# Patient Record
Sex: MALE | Race: WHITE | Employment: UNEMPLOYED | URBAN - METROPOLITAN AREA
[De-identification: names, ages, dates, MRNs, and addresses within clinical notes are randomized per-mention and may not be internally consistent; named-entity substitution may affect disease eponyms.]

---

## 2017-01-21 ENCOUNTER — APPOINTMENT (OUTPATIENT)
Dept: ULTRASOUND IMAGING | Facility: CLINIC | Age: 32
End: 2017-01-21
Attending: EMERGENCY MEDICINE

## 2017-01-21 ENCOUNTER — HOSPITAL ENCOUNTER (EMERGENCY)
Facility: CLINIC | Age: 32
Discharge: HOME | End: 2017-01-21
Attending: EMERGENCY MEDICINE | Admitting: EMERGENCY MEDICINE

## 2017-01-21 VITALS
RESPIRATION RATE: 16 BRPM | SYSTOLIC BLOOD PRESSURE: 126 MMHG | OXYGEN SATURATION: 98 % | BODY MASS INDEX: 31.5 KG/M2 | WEIGHT: 220 LBS | DIASTOLIC BLOOD PRESSURE: 96 MMHG | TEMPERATURE: 98.1 F | HEIGHT: 70 IN

## 2017-01-21 DIAGNOSIS — I87.2 STASIS DERMATITIS OF BOTH LEGS: ICD-10-CM

## 2017-01-21 PROCEDURE — 93970 EXTREMITY STUDY: CPT

## 2017-01-21 PROCEDURE — 99284 EMERGENCY DEPT VISIT MOD MDM: CPT | Mod: Z6 | Performed by: EMERGENCY MEDICINE

## 2017-01-21 PROCEDURE — 99284 EMERGENCY DEPT VISIT MOD MDM: CPT | Mod: 25 | Performed by: EMERGENCY MEDICINE

## 2017-01-21 RX ORDER — TRIAMCINOLONE ACETONIDE 1 MG/G
OINTMENT TOPICAL 2 TIMES DAILY
Qty: 80 G | Refills: 0 | Status: SHIPPED | OUTPATIENT
Start: 2017-01-21

## 2017-01-21 ASSESSMENT — ENCOUNTER SYMPTOMS
ARTHRALGIAS: 1
NUMBNESS: 0

## 2017-01-21 NOTE — ED AVS SNAPSHOT
[facility], [place], Emergency Department    [address]    [facility] [address]    Phone:  [phone]    Fax:  [phone]                                       Jones Mccabe   MRN: [id number]    Department:  [facility], Emergency Department   Date of Visit:  1/21/2017           After Visit Summary Signature Page     I have received my discharge instructions, and my questions have been answered. I have discussed any challenges I see with this plan with the nurse or doctor.    ..........................................................................................................................................  Patient/Patient Representative Signature      ..........................................................................................................................................  Patient Representative Print Name and Relationship to Patient    ..................................................               ................................................  Date                                            Time    ..........................................................................................................................................  Reviewed by Signature/Title    ...................................................              ..............................................  Date                                                            Time

## 2017-01-21 NOTE — ED PROVIDER NOTES
History     Chief Complaint   Patient presents with     Foot Pain     Bi-lat foot pain and redness started yesterday. Increasing pain. Denies injury to area.      NEREYDA Mccabe is a 31 year old male with a medical history significant for asthma and heroin abuse who presents to the emergency department for evaluation of bilateral foot pain and swelling that he first noticed yesterday. Patient states that he spends a lot of time on his feet most days, and reports that he first noticed swelling in the right lower leg and foot yesterday evening. He notes that the left lower leg and foot became swollen as well. He reports painful ambulation and itching, but denies numbness and cannot recall an injury to either foot. Of note, the patient presented to this emergency department with the same complaint of bilateral foot pain and redness about 10 months ago, and at that time his swelling was deemed likely due to statis dermatitis. Patient reports no history of DVT or PE. He uses an albuterol inhaler as needed for management of his asthma.    I have reviewed the Medications, Allergies, Past Medical and Surgical History, and Social History in the echoecho system.    PAST MEDICAL HISTORY:   Past Medical History   Diagnosis Date     Asthma      Substance abuse        PAST SURGICAL HISTORY: History reviewed. No pertinent past surgical history.    FAMILY HISTORY: No family history on file.    SOCIAL HISTORY:   Social History   Substance Use Topics     Smoking status: Current Some Day Smoker -- 0.50 packs/day     Smokeless tobacco: Never Used     Alcohol Use: No       Discharge Medication List as of 1/21/2017  3:11 AM      START taking these medications    Details   triamcinolone (KENALOG) 0.1 % ointment Apply topically 2 times dailyDisp-80 g, R-0Local Print         CONTINUE these medications which have NOT CHANGED    Details   albuterol (PROAIR HFA, PROVENTIL HFA, VENTOLIN HFA) 108 (90 BASE) MCG/ACT inhaler Inhale 2 puffs into  "the lungs every 6 hours as needed for shortness of breath / dyspnea or wheezing, Disp-1 Inhaler, R-1, Fax            No Known Allergies    Review of Systems   Constitutional: Negative.  Negative for fever, chills, diaphoresis, appetite change and fatigue.   HENT: Negative for congestion, rhinorrhea and sore throat.    Respiratory: Negative for cough, chest tightness and shortness of breath.    Cardiovascular: Positive for leg swelling. Negative for chest pain and palpitations.   Gastrointestinal: Negative for nausea, vomiting, abdominal pain and diarrhea.   Genitourinary: Negative for dysuria, flank pain and difficulty urinating.   Musculoskeletal: Positive for myalgias, arthralgias and gait problem. Negative for back pain, neck pain and neck stiffness.   Skin: Positive for rash. Negative for wound.   Allergic/Immunologic: Negative for immunocompromised state.   Neurological: Negative for dizziness, syncope, weakness, numbness and headaches.   Hematological: Does not bruise/bleed easily.       Physical Exam   BP: (!) 126/96 mmHg  Heart Rate: 102  Temp: 98.1  F (36.7  C)  Resp: 16  Height: 177.8 cm (5' 10\")  Weight: 99.791 kg (220 lb)  SpO2: 98 %  Physical Exam   Constitutional: He appears well-developed and well-nourished. No distress.   HENT:   Head: Normocephalic and atraumatic.   Mouth/Throat: Oropharynx is clear and moist.   Eyes: Conjunctivae and EOM are normal.   Neck: Normal range of motion. Neck supple.   Cardiovascular: Normal rate, regular rhythm, normal heart sounds and intact distal pulses.    Pulmonary/Chest: Effort normal and breath sounds normal. No respiratory distress.   Abdominal: Soft. There is no tenderness.   Musculoskeletal: Normal range of motion. He exhibits edema and tenderness.        Right upper leg: Normal.        Left upper leg: Normal.        Right lower leg: He exhibits tenderness, swelling and edema. He exhibits no bony tenderness.        Left lower leg: He exhibits tenderness, " swelling and edema. He exhibits no bony tenderness.   Neurological: He is alert. He has normal strength. No sensory deficit.   Skin: Skin is warm and dry. Rash noted. There is erythema.   Increased pigmentation bilateral lower legs. Erythema. Tenderness both legs. Papules. No crepitance. No significant ulceration. No blebs or bullae.   Psychiatric: He has a normal mood and affect. His behavior is normal.   Nursing note and vitals reviewed.      ED Course     [unfilled]  Procedures       1:46 AM  The patient was seen and examined by Sudheer Loaiza MD in Room 20.          Critical Care time:  none               Labs Ordered and Resulted from Time of ED Arrival Up to the Time of Departure from the ED - No data to display    Assessments & Plan (with Medical Decision Making)   Stasis dermatitis. No evidence of cellulitis or necrotizing infection. Doppler is negative for DVT. Plan elevation, support stockings, triamcinolone ointment. Clinic follow up this week. Return if persistent symptoms.    I have reviewed the nursing notes.    I have reviewed the findings, diagnosis, plan and need for follow up with the patient.    Discharge Medication List as of 1/21/2017  3:11 AM      START taking these medications    Details   triamcinolone (KENALOG) 0.1 % ointment Apply topically 2 times dailyDisp-80 g, R-0Local Print             Final diagnoses:   Stasis dermatitis of both legs     IWilliam, am serving as a trained medical scribe to document services personally performed by Sudheer Loaiza MD, based on the provider's statements to me.   Sudheer NUNN MD, was physically present and have reviewed and verified the accuracy of this note documented by William Dixon.  1/21/2017   [facility], EMERGENCY DEPARTMENT      Sudheer Loaiza MD  02/09/17 0648

## 2017-01-21 NOTE — DISCHARGE INSTRUCTIONS
Elevate legs often.  Use compression-support stockings.  Use ointment as directed.  Follow up in your primary care clinic one week.  Return if persistent symptoms.    Please make an appointment to follow up with [facility] (phone: [phone]) as soon as possible even if entirely better.

## 2017-01-21 NOTE — ED AVS SNAPSHOT
[facility], Emergency Department    [address]    [place] [address]    Phone:  [phone]    Fax:  [phone]                                       Jones Mccabe   MRN: [id number]    Department:  [facility], Emergency Department   Date of Visit:  1/21/2017           Patient Information     Date Of Birth          1985        Your diagnoses for this visit were:     Stasis dermatitis of both legs        You were seen by Sudheer Loaiza MD.      Follow-up Information     Follow up with Anne Canales Md.    Specialty:  Clinic    Contact information:    [address] [facility] [address]  [phone]          Discharge Instructions       Elevate legs often.  Use compression-support stockings.  Use ointment as directed.  Follow up in your primary care clinic one week.  Return if persistent symptoms.    Please make an appointment to follow up with [facility] (phone: [phone]) as soon as possible even if entirely better.      24 Hour Appointment Hotline       To make an appointment at any [facility], call [phone] ([phone]). If you don't have a family doctor or clinic, we will help you find one. [facility] clinics are conveniently located to serve the needs of you and your family.             Review of your medicines      START taking        Dose / Directions Last dose taken    triamcinolone 0.1 % ointment   Commonly known as:  KENALOG   Quantity:  80 g        Apply topically 2 times daily   Refills:  0          Our records show that you are taking the medicines listed below. If these are incorrect, please call your family doctor or clinic.        Dose / Directions Last dose taken    albuterol 108 (90 BASE) MCG/ACT Inhaler   Commonly known as:  PROAIR HFA/PROVENTIL HFA/VENTOLIN HFA   Dose:  2 puff   Quantity:  1 Inhaler        Inhale 2 puffs into the lungs every 6 hours as needed for shortness of breath / dyspnea or wheezing   Refills:  1               "  Prescriptions were sent or printed at these locations (1 Prescription)                   Other Prescriptions                Printed at Department/Unit printer (1 of 1)         triamcinolone (KENALOG) 0.1 % ointment                Procedures and tests performed during your visit     US Lower Extremity Venous Duplex Bilateral      Orders Needing Specimen Collection     None      Pending Results     No orders found from 2017 to 2017.            Pending Culture Results     No orders found from 2017 to 2017.            Thank you for choosing [facility]       Thank you for choosing [facility] for your care. Our goal is always to provide you with excellent care. Hearing back from our patients is one way we can continue to improve our services. Please take a few minutes to complete the written survey that you may receive in the mail after you visit with us. Thank you!        [facility] Information     [employer] lets you send messages to your doctor, view your test results, renew your prescriptions, schedule appointments and more. To sign up, go to [email or web address] . Click on \"Log in\" on the left side of the screen, which will take you to the Welcome page. Then click on \"Sign up Now\" on the right side of the page.     You will be asked to enter the access code listed below, as well as some personal information. Please follow the directions to create your username and password.     Your access code is: [email or web address]  Expires: 2017  3:11 AM     Your access code will  in 90 days. If you need help or a new code, please call your [facility] clinic or [phone].        Care EveryWhere ID     This is your Care EveryWhere ID. This could be used by other organizations to access your [facility] medical records  [id number]        After Visit Summary       This is your record. Keep this with you and show to your community pharmacist(s) and doctor(s) at your next visit.                  "

## 2017-02-09 ASSESSMENT — ENCOUNTER SYMPTOMS
HEADACHES: 0
NAUSEA: 0
CONSTITUTIONAL NEGATIVE: 1
DYSURIA: 0
PALPITATIONS: 0
RHINORRHEA: 0
CHEST TIGHTNESS: 0
SORE THROAT: 0
DIZZINESS: 0
FEVER: 0
SHORTNESS OF BREATH: 0
COUGH: 0
VOMITING: 0
BRUISES/BLEEDS EASILY: 0
WOUND: 0
FATIGUE: 0
MYALGIAS: 1
NECK STIFFNESS: 0
DIFFICULTY URINATING: 0
WEAKNESS: 0
NECK PAIN: 0
FLANK PAIN: 0
BACK PAIN: 0
DIAPHORESIS: 0
CHILLS: 0
DIARRHEA: 0
ABDOMINAL PAIN: 0
APPETITE CHANGE: 0

## 2017-03-16 ENCOUNTER — HOSPITAL ENCOUNTER (EMERGENCY)
Facility: CLINIC | Age: 32
Discharge: HOME | End: 2017-03-16
Attending: EMERGENCY MEDICINE | Admitting: EMERGENCY MEDICINE

## 2017-03-16 ENCOUNTER — APPOINTMENT (OUTPATIENT)
Dept: CT IMAGING | Facility: CLINIC | Age: 32
End: 2017-03-16
Attending: EMERGENCY MEDICINE

## 2017-03-16 VITALS
HEIGHT: 69 IN | DIASTOLIC BLOOD PRESSURE: 80 MMHG | SYSTOLIC BLOOD PRESSURE: 116 MMHG | WEIGHT: 220 LBS | OXYGEN SATURATION: 98 % | BODY MASS INDEX: 32.58 KG/M2 | HEART RATE: 120 BPM | RESPIRATION RATE: 20 BRPM | TEMPERATURE: 98.4 F

## 2017-03-16 DIAGNOSIS — S01.111A EYEBROW LACERATION, RIGHT, INITIAL ENCOUNTER: ICD-10-CM
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
## 2017-03-16 DIAGNOSIS — S02.2XXA CLOSED FRACTURE OF NASAL BONE, INITIAL ENCOUNTER: ICD-10-CM

## 2017-03-16 DIAGNOSIS — S00.03XA CONTUSION OF FACE, SCALP AND NECK, INITIAL ENCOUNTER: ICD-10-CM

## 2017-03-16 DIAGNOSIS — S01.01XA LACERATION OF SCALP, INITIAL ENCOUNTER: ICD-10-CM

## 2017-03-16 DIAGNOSIS — S01.01XA SCALP LACERATION, INITIAL ENCOUNTER: ICD-10-CM

## 2017-03-16 DIAGNOSIS — S00.83XA CONTUSION OF FACE, SCALP AND NECK, INITIAL ENCOUNTER: ICD-10-CM
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
## 2017-03-16 DIAGNOSIS — Z23 NEED FOR PROPHYLACTIC VACCINATION AND INOCULATION AGAINST CHOLERA ALONE: ICD-10-CM

## 2017-03-16 DIAGNOSIS — S10.93XA CONTUSION OF FACE, SCALP AND NECK, INITIAL ENCOUNTER: ICD-10-CM

## 2017-03-16 PROCEDURE — 12002 RPR S/N/AX/GEN/TRNK2.6-7.5CM: CPT

## 2017-03-16 PROCEDURE — 99284 EMERGENCY DEPT VISIT MOD MDM: CPT | Mod: 25

## 2017-03-16 PROCEDURE — 12002 RPR S/N/AX/GEN/TRNK2.6-7.5CM: CPT | Mod: Z6 | Performed by: EMERGENCY MEDICINE

## 2017-03-16 PROCEDURE — 70450 CT HEAD/BRAIN W/O DYE: CPT | Mod: 76

## 2017-03-16 PROCEDURE — 90471 IMMUNIZATION ADMIN: CPT

## 2017-03-16 PROCEDURE — 12011 RPR F/E/E/N/L/M 2.5 CM/<: CPT | Mod: Z6 | Performed by: EMERGENCY MEDICINE

## 2017-03-16 PROCEDURE — 99284 EMERGENCY DEPT VISIT MOD MDM: CPT | Mod: 25 | Performed by: EMERGENCY MEDICINE

## 2017-03-16 PROCEDURE — 70486 CT MAXILLOFACIAL W/O DYE: CPT | Mod: 76

## 2017-03-16 PROCEDURE — 12011 RPR F/E/E/N/L/M 2.5 CM/<: CPT

## 2017-03-16 RX ORDER — LIDOCAINE HYDROCHLORIDE AND EPINEPHRINE 10; 10 MG/ML; UG/ML
1 INJECTION, SOLUTION INFILTRATION; PERINEURAL
Status: DISCONTINUED | OUTPATIENT
Start: 2017-03-16 | End: 2017-03-16 | Stop reason: HOSPADM

## 2017-03-16 RX ADMIN — CLOSTRIDIUM TETANI TOXOID ANTIGEN (FORMALDEHYDE INACTIVATED), CORYNEBACTERIUM DIPHTHERIAE TOXOID ANTIGEN (FORMALDEHYDE INACTIVATED), BORDETELLA PERTUSSIS TOXOID ANTIGEN (GLUTARALDEHYDE INACTIVATED), BORDETELLA PERTUSSIS FILAMENTOUS HEMAGGLUTININ ANTIGEN (FORMALDEHYDE INACTIVATED), BORDETELLA PERTUSSIS PERTACTIN ANTIGEN, AND BORDETELLA PERTUSSIS FIMBRIAE 2/3 ANTIGEN 0.5 ML: 5; 2; 2.5; 5; 3; 5 INJECTION, SUSPENSION INTRAMUSCULAR at 02:35

## 2017-03-16 ASSESSMENT — ENCOUNTER SYMPTOMS
FEVER: 0
ABDOMINAL PAIN: 0
SHORTNESS OF BREATH: 0

## 2017-03-16 NOTE — DISCHARGE INSTRUCTIONS
Please make an appointment to follow up with Your Primary Care Provider in 10 days for suture removal.      Facial Contusion  A contusion is another word for a bruise. It happens when small blood vessels break open and leak blood into the nearby area. A facial contusion can result from a bump, hit, or fall. This may happen during sports or an accident. Symptoms of a contusion often include changes in skin color (bruising), swelling, and pain.   The swelling from the contusion should decrease in a few days. Bruising and pain may take several weeks to go away.   Home care    If you have been prescribed medicines for pain, take them as directed.    To help reduce swelling and pain, wrap a cold pack or bag of frozen peas in a thin towel. Put it on the injured area for up to 20 minutes. Do this a few times a day until the swelling goes down.     If you have scrapes or cuts on your face requiring stiches or other closures, care for them as directed.    For the next 24 hours (or longer if instructed):    Don t drink alcohol, or use sedatives or medicines that make you sleepy.    Don t drive or operate machinery.    Avoid doing anything strenuous. Don t lift or strain.    Do not return to sports or other activity that could result in another head injury.  Note about concussion  Because the injury was to your head, it is possible that a concussion (mild brain injury) could result. You don't have signs of a concussion at this time. But symptoms can show up later. Be alert for signs and symptoms of a concussion. Seek emergency medical care if any of these develop over the next hours to days:    Headache    Nausea or vomiting    Dizziness    Sensitivity to light or noise    Unusual sleepiness or grogginess    Trouble falling asleep    Personality changes    Vision changes    Memory loss    Confusion    Trouble walking or clumsiness    Loss of consciousness (even for a short time)    Inability to be awakened   Follow-up  care  Follow up with your healthcare provider or our staff as directed.  When to seek medical advice  Call your healthcare provider right away if any of these occur:    Swelling or pain that gets worse, not better    New swelling or pain    Warmth or drainage from the swollen area or from cuts or scrapes    Fluid drainage or bleeding from the nose or ears    Fever of 100.4 F (38 C) or higher, or as directed by your healthcare provider    1744-8085 [employer]. [address] [place] [address], [place] [address]. All rights reserved. This information is not intended as a substitute for professional medical care. Always follow your healthcare professional's instructions.            *LACERATION (All: sutures, staples, tape, glue)  A laceration is a cut through the skin. This will usually require stitches (sutures) or staples if it is deep. Minor cuts may be treated with a tape closure ( Steri-Strips ) or Dermabond skin glue.    HOME CARE:  1. EXTREMITY, FACE or TRUNK WOUNDS: Keep the wound clean and dry. If a bandage was applied and it becomes wet or dirty, replace it. Otherwise, leave it in place for the first 24 hours.    If stitches or staples were used, clean the wound daily. Protect the wound from sunlight and tanning lamps.    After removing the bandage, wash the area with soap and water. Use a wet cotton swab (Q tip) to loosen and remove any blood or crust that forms.    After cleaning, apply a thin layer of Polysporin or Bacitracin ointment. This will keep the wound clean and make it easier to remove the stitches or staples. Reapply a fresh bandage.    You may remove the bandage to shower as usual after the first 24 hours, but do not soak the area in water (no swimming) until the stitches or staples are removed.    If Steri-Strips were used, keep the area clean and dry. If it becomes wet, blot it dry with a towel. It is okay to take a brief shower, but avoid scrubbing the area.    If Dermabond skin adhesive  was used, do not scratch, rub or pick at the adhesive film. Do not place tape directly over the film. Do not apply liquid, ointment or creams to the wound while the film is in place. Do not clean the wound with peroxide and do not apply ointments. Avoid activities that cause heavy sweating until the film has fallen off. Protect the wound from prolonged exposure to sunlight or tanning lamps. You may shower as usual but do not soak the wound in water (no baths or swimming). The film will fall off by itself in 5-10 days.  2. SCALP WOUNDS: During the first two days, you may carefully rinse your hair in the shower to remove blood, glass or dirt particles. After two days, you may shower and shampoo your hair normally. Do not soak your scalp in the tub or go swimming until the stitches or staples have been removed.  3. MOUTH WOUNDS: Eat soft foods to reduce pain. If the cut is inside of your mouth, clean by rinsing after each meal and at bedtime with a mixture of equal parts water and Hydrogen Peroxide (do not swallow!). Or, you can use a cotton swab to directly apply Hydrogen Peroxide onto the cut.  4. You may use acetaminophen (Tylenol) 650-1000 mg every 6 hours or ibuprofen (Motrin, Advil) 600 mg every 6-8 hours with food to control pain, if you are able to take these medicines. [NOTE: If you have chronic liver or kidney disease or ever had a stomach ulcer or GI bleeding, talk with your doctor before using these medicines.]  Use sunscreen on the area for 6 months after the wound heals to keep the scar from getting darker.   FOLLOW UP: Most skin wounds heal within ten days. Mouth and facial wounds heal within five days. However, even with proper treatment, a wound infection may sometimes occur. Therefore, you should check the wound daily for signs of infection listed below.  Stitches should be removed from the face within five days; stitches and staples should be removed from other parts of the body within 7-10 days.  Unless you are told to come back to the emergency room, you may have your doctor or urgent care remove the stitches. If dissolving stitches were used in the mouth, these will fall out or dissolve without the need for removal. If tape closures ( Steri-Strips ) were used, remove them yourself if they have not fallen off after 7 days. If Dermabond skin glue was used, the film will fall off by itself in 5-10 days.   GET PROMPT MEDICAL ATTENTION if any of the following occur:    Increasing pain in the wound    Redness, swelling or pus coming from the wound    Fever over 101 F (38.3 C) oral    If stitches or staples come apart or fall out or if Steri-Strips fall off before seven days    If the wound edges re-open    Bleeding not controlled by direct pressure    8033-6362 [employer], [address] [place] [address], [place] [address]. All rights reserved. This information is not intended as a substitute for professional medical care. Always follow your healthcare professional's instructions.
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
## 2017-03-16 NOTE — ED AVS SNAPSHOT
[facility], [place], Emergency Department    [address] [place] [address]    Phone:  [phone]                                       Kwame Mccabe   MRN: [id number]    Department:  [facility], Emergency Department   Date of Visit:  3/16/2017           After Visit Summary Signature Page     I have received my discharge instructions, and my questions have been answered. I have discussed any challenges I see with this plan with the nurse or doctor.    ..........................................................................................................................................  Patient/Patient Representative Signature      ..........................................................................................................................................  Patient Representative Print Name and Relationship to Patient    ..................................................               ................................................  Date                                            Time    ..........................................................................................................................................  Reviewed by Signature/Title    ...................................................              ..............................................  Date                                                            Time
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
## 2018-08-08 ENCOUNTER — TRANSFERRED RECORDS (OUTPATIENT)
Dept: HEALTH INFORMATION MANAGEMENT | Facility: CLINIC | Age: 33
End: 2018-08-08
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
